# Patient Record
Sex: FEMALE | Race: OTHER | HISPANIC OR LATINO | URBAN - METROPOLITAN AREA
[De-identification: names, ages, dates, MRNs, and addresses within clinical notes are randomized per-mention and may not be internally consistent; named-entity substitution may affect disease eponyms.]

---

## 2022-08-20 ENCOUNTER — HOSPITAL ENCOUNTER (EMERGENCY)
Facility: HOSPITAL | Age: 52
Discharge: HOME | End: 2022-08-20
Attending: INTERNAL MEDICINE
Payer: MEDICAID

## 2022-08-20 VITALS
HEIGHT: 66 IN | DIASTOLIC BLOOD PRESSURE: 87 MMHG | RESPIRATION RATE: 19 BRPM | OXYGEN SATURATION: 98 % | WEIGHT: 191.81 LBS | HEART RATE: 65 BPM | SYSTOLIC BLOOD PRESSURE: 138 MMHG | TEMPERATURE: 99 F | BODY MASS INDEX: 30.82 KG/M2

## 2022-08-20 DIAGNOSIS — Z76.0 MEDICATION REFILL: Primary | ICD-10-CM

## 2022-08-20 DIAGNOSIS — Z86.69 HISTORY OF SEIZURE DISORDER: ICD-10-CM

## 2022-08-20 PROCEDURE — 99284 EMERGENCY DEPT VISIT MOD MDM: CPT

## 2022-08-20 RX ORDER — LAMOTRIGINE 25 MG/1
50 TABLET ORAL
COMMUNITY
End: 2022-08-20 | Stop reason: SDUPTHER

## 2022-08-20 RX ORDER — LAMOTRIGINE 100 MG/1
100 TABLET ORAL NIGHTLY
Qty: 30 TABLET | Refills: 0 | Status: SHIPPED | OUTPATIENT
Start: 2022-08-20 | End: 2022-09-19

## 2022-08-20 RX ORDER — LAMOTRIGINE 25 MG/1
50 TABLET ORAL
Qty: 60 TABLET | Refills: 0 | Status: SHIPPED | OUTPATIENT
Start: 2022-08-20 | End: 2022-09-19

## 2022-08-20 RX ORDER — ATENOLOL 50 MG/1
50 TABLET ORAL DAILY
Qty: 30 TABLET | Refills: 0 | OUTPATIENT
Start: 2022-08-20 | End: 2022-09-29

## 2022-08-20 RX ORDER — ATENOLOL 50 MG/1
50 TABLET ORAL DAILY
COMMUNITY
End: 2022-08-20 | Stop reason: SDUPTHER

## 2022-08-20 RX ORDER — LAMOTRIGINE 100 MG/1
100 TABLET ORAL NIGHTLY
COMMUNITY
End: 2022-08-20 | Stop reason: SDUPTHER

## 2022-08-21 NOTE — ED PROVIDER NOTES
Encounter Date: 8/20/2022       History     Chief Complaint   Patient presents with    Medication Refill     Pt came here from [facility] 3 days ago and is requesting a refill for her atenolol 50 mg Q day and Lamotrigine for seizures 50 mg bid, vss. She has been out of meds for 3 days, vss.      Pt is a [place] speaking 52 y.o. female who presents to the [facility] ED requesting a refill of her blood pressure and seizure medication. Pt recently moved to the [place] and has run out of medication. Denies headache, blurry vision, chest pain, SOB, weakness, recent seizure activity, or dizziness.  used for all verbal communication.        Review of patient's allergies indicates:  No Known Allergies  Past Medical History:   Diagnosis Date    Hypertension     Seizures      Past Surgical History:   Procedure Laterality Date    HYSTERECTOMY       No family history on file.  Social History     Tobacco Use    Smoking status: Never Smoker    Smokeless tobacco: Never Used     Review of Systems   Constitutional: Negative for chills, diaphoresis, fatigue and fever.   HENT: Negative for facial swelling, rhinorrhea, sinus pressure, sinus pain, sore throat and trouble swallowing.    Respiratory: Negative for cough, chest tightness, shortness of breath and wheezing.    Cardiovascular: Negative for chest pain, palpitations and leg swelling.   Gastrointestinal: Negative for abdominal pain, diarrhea, nausea and vomiting.   Genitourinary: Negative for dysuria, flank pain, frequency, hematuria and urgency.   Musculoskeletal: Negative for arthralgias, back pain, joint swelling and myalgias.   Skin: Negative for color change and rash.   Neurological: Negative for dizziness, syncope, weakness and light-headedness.   Hematological: Does not bruise/bleed easily.   All other systems reviewed and are negative.      Physical Exam     Initial Vitals [08/20/22 2010]   BP Pulse Resp Temp SpO2   138/87 65 19 98.9 °F (37.2 °C) 98 %       MAP       --         Physical Exam    Nursing note and vitals reviewed.  Constitutional: She appears well-developed and well-nourished.   HENT:   Head: Normocephalic and atraumatic.   Nose: Nose normal.   Mouth/Throat: Oropharynx is clear and moist.   Eyes: Conjunctivae and EOM are normal. Pupils are equal, round, and reactive to light.   Neck: Neck supple.   Normal range of motion.  Cardiovascular: Normal rate, regular rhythm, normal heart sounds and intact distal pulses.   Pulmonary/Chest: Effort normal and breath sounds normal. No respiratory distress. She has no wheezes. She has no rhonchi. She has no rales. She exhibits no tenderness.   Abdominal: Abdomen is soft and flat. Bowel sounds are normal. She exhibits no distension. There is no abdominal tenderness. There is no rebound, no guarding, no tenderness at McBurney's point and negative Loyd's sign. negative psoas sign  Musculoskeletal:         General: Normal range of motion.      Cervical back: Normal range of motion and neck supple.     Neurological: She is alert and oriented to person, place, and time. She has normal strength and normal reflexes.   Skin: Skin is warm and dry. Capillary refill takes less than 2 seconds.   Psychiatric: She has a normal mood and affect. Her speech is normal and behavior is normal. Judgment and thought content normal.         ED Course   Procedures  Labs Reviewed - No data to display       Imaging Results    None          Medications - No data to display  Medical Decision Making:   Differential Diagnosis:   Medication refill  ED Management:  8:40 PM Reassessed patient at this time. Reports condition has improved. Discussed with patient all pertinent ED information and results. Discussed diagnosis and treatment plan with patient. Follow up instructions and return to ED instruction have been given. All questions and concerns were addressed at this time. Patient voices understanding of information and instructions. Patient is  comfortable with plan and discharge. Patient is stable for discharge.                         Clinical Impression:   Final diagnoses:  [Z76.0] Medication refill (Primary)  [Z86.69] History of seizure disorder          ED Disposition Condition    Discharge Stable        ED Prescriptions     Medication Sig Dispense Start Date End Date Auth. Provider    atenoloL (TENORMIN) 50 MG tablet Take 1 tablet (50 mg total) by mouth once daily. 30 tablet 8/20/2022 9/19/2022 SHARON Antonio Jr.    lamoTRIgine (LAMICTAL) 100 MG tablet Take 1 tablet (100 mg total) by mouth nightly. 30 tablet 8/20/2022 9/19/2022 SHARON Antonio Jr.    lamoTRIgine (LAMICTAL) 25 MG tablet Take 2 tablets (50 mg total) by mouth before breakfast. 60 tablet 8/20/2022 9/19/2022 SHARON Antonio Jr.        Follow-up Information     Follow up With Specialties Details Why Contact Info    [facility] - Emergency Dept Emergency Medicine In 3 days As needed, If symptoms worsen [facility] [address] [phone]    [facility]  Schedule an appointment as soon as possible for a visit in 1 week Follow up with [facility] to obtain a PCP [facility] [address]           SHARON Antonio Jr.  08/20/22 2044

## 2022-09-29 ENCOUNTER — HOSPITAL ENCOUNTER (EMERGENCY)
Facility: HOSPITAL | Age: 52
Discharge: HOME | End: 2022-09-29
Attending: STUDENT IN AN ORGANIZED HEALTH CARE EDUCATION/TRAINING PROGRAM
Payer: MEDICAID

## 2022-09-29 VITALS
HEART RATE: 75 BPM | HEIGHT: 63 IN | SYSTOLIC BLOOD PRESSURE: 145 MMHG | RESPIRATION RATE: 16 BRPM | DIASTOLIC BLOOD PRESSURE: 82 MMHG | BODY MASS INDEX: 35.7 KG/M2 | WEIGHT: 201.5 LBS | OXYGEN SATURATION: 97 % | TEMPERATURE: 99 F

## 2022-09-29 DIAGNOSIS — I10 HYPERTENSION, UNSPECIFIED TYPE: ICD-10-CM

## 2022-09-29 DIAGNOSIS — Z76.0 ENCOUNTER FOR MEDICATION REFILL: Primary | ICD-10-CM

## 2022-09-29 PROCEDURE — 99283 EMERGENCY DEPT VISIT LOW MDM: CPT | Mod: 25

## 2022-09-29 RX ORDER — ATENOLOL 50 MG/1
50 TABLET ORAL DAILY
Qty: 30 TABLET | Refills: 0 | Status: SHIPPED | OUTPATIENT
Start: 2022-09-29 | End: 2022-09-29 | Stop reason: SDUPTHER

## 2022-09-29 RX ORDER — ATENOLOL 50 MG/1
50 TABLET ORAL DAILY
Qty: 30 TABLET | Refills: 1 | Status: SHIPPED | OUTPATIENT
Start: 2022-09-29 | End: 2022-10-29

## 2022-09-29 NOTE — ED PROVIDER NOTES
Encounter Date: 9/29/2022       History     Chief Complaint   Patient presents with    Medication Refill     Patient has Rx for Atenolol 50mg from [facility]; pharmacy here cannot fill due to foreign prescriber. Asymptomatic at this time.     Patient reports to the ER for a refill of her atenolol; pt has no complaints at this time    The history is provided by the patient.   Medication Refill  This is a chronic problem. Pertinent negatives include no chest pain, no abdominal pain, no headaches and no shortness of breath. The symptoms are relieved by medications.   Review of patient's allergies indicates:  No Known Allergies  Past Medical History:   Diagnosis Date    Hypertension     Seizures      Past Surgical History:   Procedure Laterality Date    HYSTERECTOMY       History reviewed. No pertinent family history.  Social History     Tobacco Use    Smoking status: Never    Smokeless tobacco: Never     Review of Systems   Constitutional:  Negative for fever.   HENT:  Negative for sore throat.    Eyes: Negative.    Respiratory:  Negative for shortness of breath.    Cardiovascular:  Negative for chest pain.   Gastrointestinal:  Negative for abdominal pain and nausea.   Genitourinary:  Negative for dysuria.   Musculoskeletal:  Negative for back pain.   Skin:  Negative for rash.   Neurological:  Negative for weakness and headaches.   Hematological:  Does not bruise/bleed easily.   Psychiatric/Behavioral: Negative.       Physical Exam     Initial Vitals [09/29/22 1436]   BP Pulse Resp Temp SpO2   (!) 145/82 75 16 98.5 °F (36.9 °C) 97 %      MAP       --         Physical Exam    Vitals reviewed.  Constitutional: She appears well-developed and well-nourished.   HENT:   Head: Normocephalic and atraumatic.   Eyes: Conjunctivae and EOM are normal. Pupils are equal, round, and reactive to light.   Neck: Neck supple.   Normal range of motion.  Cardiovascular:  Normal rate, regular rhythm and normal heart sounds.            Pulmonary/Chest: Breath sounds normal. No respiratory distress. She has no wheezes. She exhibits no tenderness.   Abdominal: Abdomen is soft. Bowel sounds are normal. There is no abdominal tenderness.   Musculoskeletal:         General: Normal range of motion.      Cervical back: Normal range of motion and neck supple.     Neurological: She is alert and oriented to person, place, and time. She displays normal reflexes. No cranial nerve deficit or sensory deficit.   Skin: Skin is warm and dry.   Psychiatric: She has a normal mood and affect. Her behavior is normal. Judgment and thought content normal.       ED Course   Procedures  Labs Reviewed - No data to display       Imaging Results    None          Medications - No data to display        APC / Resident Notes:   I was not physically present during the history, exam or disposition of this patient. I was available at all times for consultation. (Britta)                   Clinical Impression:   Final diagnoses:  [Z76.0] Encounter for medication refill (Primary)  [I10] Hypertension, unspecified type      ED Disposition Condition    Discharge Stable          ED Prescriptions       Medication Sig Dispense Start Date End Date Auth. Provider    atenoloL (TENORMIN) 50 MG tablet  (Status: Discontinued) Take 1 tablet (50 mg total) by mouth once daily. 30 tablet 9/29/2022 9/29/2022 JERE Milligan    atenoloL (TENORMIN) 50 MG tablet Take 1 tablet (50 mg total) by mouth once daily. 30 tablet 9/29/2022 10/29/2022 JERE Milligan          Follow-up Information       Follow up With Specialties Details Why Contact Info    discharge followup    If your symptoms become WORSE or you DO NOT IMPROVE and you are unable to reach your health care provider, you should RETURN to the emergency department    discharge info    Discussed all pertinent ED information, results, diagnosis and treatment plan; All questions and concerns were addressed at this time. Patient voices  understanding of information and instructions. Patient is comfortable with plan and discharge             JERE Milligan  09/29/22 1553       Kareem Callejas MD  09/30/22 7722

## 2023-01-20 ENCOUNTER — PATIENT MESSAGE (OUTPATIENT)
Dept: RESEARCH | Facility: HOSPITAL | Age: 53
End: 2023-01-20
Payer: MEDICAID

## 2024-04-14 ENCOUNTER — HOSPITAL ENCOUNTER (EMERGENCY)
Facility: HOSPITAL | Age: 54
Discharge: HOME | End: 2024-04-14
Attending: INTERNAL MEDICINE
Payer: COMMERCIAL

## 2024-04-14 VITALS
DIASTOLIC BLOOD PRESSURE: 75 MMHG | RESPIRATION RATE: 18 BRPM | BODY MASS INDEX: 38.67 KG/M2 | TEMPERATURE: 98 F | HEART RATE: 65 BPM | HEIGHT: 63 IN | OXYGEN SATURATION: 96 % | SYSTOLIC BLOOD PRESSURE: 123 MMHG | WEIGHT: 218.25 LBS

## 2024-04-14 DIAGNOSIS — R06.02 SHORTNESS OF BREATH: ICD-10-CM

## 2024-04-14 DIAGNOSIS — Z76.0 ENCOUNTER FOR MEDICATION REFILL: ICD-10-CM

## 2024-04-14 DIAGNOSIS — R06.02 SOB (SHORTNESS OF BREATH): ICD-10-CM

## 2024-04-14 DIAGNOSIS — J32.9 SINUSITIS, BACTERIAL: Primary | ICD-10-CM

## 2024-04-14 DIAGNOSIS — B96.89 SINUSITIS, BACTERIAL: Primary | ICD-10-CM

## 2024-04-14 LAB
ALBUMIN SERPL-MCNC: 3.8 G/DL (ref 3.5–5)
ALBUMIN/GLOB SERPL: 0.9 RATIO (ref 1.1–2)
ALP SERPL-CCNC: 89 UNIT/L (ref 40–150)
ALT SERPL-CCNC: 34 UNIT/L (ref 0–55)
APPEARANCE UR: CLEAR
AST SERPL-CCNC: 23 UNIT/L (ref 5–34)
BACTERIA #/AREA URNS AUTO: ABNORMAL /HPF
BILIRUB SERPL-MCNC: 0.2 MG/DL
BILIRUB UR QL STRIP.AUTO: NEGATIVE
BUN SERPL-MCNC: 13.8 MG/DL (ref 9.8–20.1)
CALCIUM SERPL-MCNC: 9.7 MG/DL (ref 8.4–10.2)
CHLORIDE SERPL-SCNC: 107 MMOL/L (ref 98–107)
CO2 SERPL-SCNC: 23 MMOL/L (ref 22–29)
COLOR UR AUTO: ABNORMAL
CREAT SERPL-MCNC: 0.79 MG/DL (ref 0.55–1.02)
D DIMER PPP IA.FEU-MCNC: 0.46 UG/ML FEU (ref 0–0.5)
FLUAV AG UPPER RESP QL IA.RAPID: NOT DETECTED
FLUBV AG UPPER RESP QL IA.RAPID: NOT DETECTED
GFR SERPLBLD CREATININE-BSD FMLA CKD-EPI: >60 MLS/MIN/1.73/M2
GLOBULIN SER-MCNC: 4.3 GM/DL (ref 2.4–3.5)
GLUCOSE SERPL-MCNC: 105 MG/DL (ref 74–100)
GLUCOSE UR QL STRIP.AUTO: NORMAL
HOLD SPECIMEN: NORMAL
HYALINE CASTS #/AREA URNS LPF: ABNORMAL /LPF
KETONES UR QL STRIP.AUTO: NEGATIVE
LEUKOCYTE ESTERASE UR QL STRIP.AUTO: NEGATIVE
MUCOUS THREADS URNS QL MICRO: ABNORMAL /LPF
NITRITE UR QL STRIP.AUTO: NEGATIVE
PH UR STRIP.AUTO: 6 [PH]
POTASSIUM SERPL-SCNC: 4.2 MMOL/L (ref 3.5–5.1)
PROT SERPL-MCNC: 8.1 GM/DL (ref 6.4–8.3)
PROT UR QL STRIP.AUTO: NEGATIVE
RBC #/AREA URNS AUTO: ABNORMAL /HPF
RBC UR QL AUTO: NEGATIVE
SARS-COV-2 RNA RESP QL NAA+PROBE: NOT DETECTED
SODIUM SERPL-SCNC: 138 MMOL/L (ref 136–145)
SP GR UR STRIP.AUTO: 1.02 (ref 1–1.03)
SQUAMOUS #/AREA URNS LPF: ABNORMAL /HPF
TROPONIN I SERPL-MCNC: 0.02 NG/ML (ref 0–0.04)
UROBILINOGEN UR STRIP-ACNC: NORMAL
WBC #/AREA URNS AUTO: ABNORMAL /HPF

## 2024-04-14 PROCEDURE — 0240U COVID/FLU A&B PCR: CPT | Performed by: INTERNAL MEDICINE

## 2024-04-14 PROCEDURE — 84484 ASSAY OF TROPONIN QUANT: CPT | Performed by: INTERNAL MEDICINE

## 2024-04-14 PROCEDURE — 93005 ELECTROCARDIOGRAM TRACING: CPT

## 2024-04-14 PROCEDURE — 85379 FIBRIN DEGRADATION QUANT: CPT | Performed by: INTERNAL MEDICINE

## 2024-04-14 PROCEDURE — 25000003 PHARM REV CODE 250: Performed by: INTERNAL MEDICINE

## 2024-04-14 PROCEDURE — 81001 URINALYSIS AUTO W/SCOPE: CPT | Performed by: INTERNAL MEDICINE

## 2024-04-14 PROCEDURE — 99285 EMERGENCY DEPT VISIT HI MDM: CPT | Mod: 25

## 2024-04-14 PROCEDURE — 80053 COMPREHEN METABOLIC PANEL: CPT | Performed by: INTERNAL MEDICINE

## 2024-04-14 RX ORDER — ATENOLOL 50 MG/1
50 TABLET ORAL DAILY
Qty: 30 TABLET | Refills: 1 | Status: SHIPPED | OUTPATIENT
Start: 2024-04-14 | End: 2024-06-12 | Stop reason: SDUPTHER

## 2024-04-14 RX ORDER — CETIRIZINE HYDROCHLORIDE, PSEUDOEPHEDRINE HYDROCHLORIDE 5; 120 MG/1; MG/1
1 TABLET, FILM COATED, EXTENDED RELEASE ORAL DAILY
Qty: 10 TABLET | Refills: 0 | Status: SHIPPED | OUTPATIENT
Start: 2024-04-14 | End: 2024-04-24

## 2024-04-14 RX ORDER — ATENOLOL 50 MG/1
50 TABLET ORAL DAILY
Qty: 30 TABLET | Refills: 1 | Status: SHIPPED | OUTPATIENT
Start: 2024-04-14 | End: 2024-04-14

## 2024-04-14 RX ORDER — AMOXICILLIN AND CLAVULANATE POTASSIUM 875; 125 MG/1; MG/1
1 TABLET, FILM COATED ORAL 2 TIMES DAILY
Qty: 14 TABLET | Refills: 0 | Status: SHIPPED | OUTPATIENT
Start: 2024-04-14 | End: 2024-06-12 | Stop reason: ALTCHOICE

## 2024-04-14 RX ORDER — BUTALBITAL, ACETAMINOPHEN AND CAFFEINE 50; 325; 40 MG/1; MG/1; MG/1
2 TABLET ORAL
Status: COMPLETED | OUTPATIENT
Start: 2024-04-14 | End: 2024-04-14

## 2024-04-14 RX ORDER — DICLOFENAC SODIUM 50 MG/1
50 TABLET, DELAYED RELEASE ORAL 2 TIMES DAILY PRN
Qty: 20 TABLET | Refills: 0 | Status: SHIPPED | OUTPATIENT
Start: 2024-04-14 | End: 2024-06-12 | Stop reason: SDUPTHER

## 2024-04-14 RX ADMIN — BUTALBITAL, ACETAMINOPHEN, AND CAFFEINE 2 TABLET: 50; 325; 40 TABLET ORAL at 04:04

## 2024-04-14 NOTE — ED PROVIDER NOTES
Encounter Date: 4/14/2024       History     Chief Complaint   Patient presents with    Shortness of Breath     SOB, chest pain, back pain, and productive cough x 2 weeks.      Presents with frontal headache, sinus pain and chest pain for the last few days. States Hx of HTN and seizures. Denies fever, sick contacts, nausea, vomiting, hemoptysis or swelling.     The history is provided by the patient.     Review of patient's allergies indicates:  No Known Allergies  Past Medical History:   Diagnosis Date    Hypertension     Seizures      Past Surgical History:   Procedure Laterality Date    HYSTERECTOMY       No family history on file.  Social History     Tobacco Use    Smoking status: Never    Smokeless tobacco: Never   Substance Use Topics    Drug use: Never     Review of Systems   HENT:  Positive for sinus pressure.    Respiratory:  Positive for cough.    Cardiovascular:  Positive for chest pain.   Musculoskeletal:  Positive for back pain.   Neurological:  Positive for headaches.   All other systems reviewed and are negative.      Physical Exam     Initial Vitals [04/14/24 1542]   BP Pulse Resp Temp SpO2   (!) 145/83 67 18 98 °F (36.7 °C) 97 %      MAP       --         Physical Exam    Nursing note and vitals reviewed.  Constitutional: She appears well-developed and well-nourished. No distress.   HENT:   Head: Normocephalic and atraumatic.   Mouth/Throat: Oropharynx is clear and moist. No oropharyngeal exudate.   Eyes: Conjunctivae and EOM are normal. Pupils are equal, round, and reactive to light.   Neck: Neck supple. No tracheal deviation present. No JVD present.   Normal range of motion.  Cardiovascular:  Normal rate, regular rhythm, normal heart sounds and intact distal pulses.           Pulmonary/Chest: Breath sounds normal.   Abdominal: Abdomen is soft. Bowel sounds are normal. She exhibits no distension. There is no abdominal tenderness. There is no rebound and no guarding.   Musculoskeletal:          General: No edema. Normal range of motion.      Cervical back: Normal range of motion and neck supple.     Neurological: She is alert and oriented to person, place, and time. She has normal strength. GCS score is 15. GCS eye subscore is 4. GCS verbal subscore is 5. GCS motor subscore is 6.   Skin: Skin is warm and dry. No rash noted.   Psychiatric: Her behavior is normal.         ED Course   Procedures  Labs Reviewed   URINALYSIS, REFLEX TO URINE CULTURE - Abnormal; Notable for the following components:       Result Value    Bacteria, UA Trace (*)     Squamous Epithelial Cells, UA Occ (*)     Mucous, UA Trace (*)     All other components within normal limits   COMPREHENSIVE METABOLIC PANEL - Abnormal; Notable for the following components:    Glucose Level 105 (*)     Globulin 4.3 (*)     Albumin/Globulin Ratio 0.9 (*)     All other components within normal limits   TROPONIN I - Normal   COVID/FLU A&B PCR - Normal    Narrative:     The Xpert Xpress SARS-CoV-2/FLU/RSV plus is a rapid, multiplexed real-time PCR test intended for the simultaneous qualitative detection and differentiation of SARS-CoV-2, Influenza A, Influenza B, and respiratory syncytial virus (RSV) viral RNA in either nasopharyngeal swab or nasal swab specimens.         D DIMER, QUANTITATIVE - Normal   EXTRA TUBES    Narrative:     The following orders were created for panel order EXTRA TUBES.  Procedure                               Abnormality         Status                     ---------                               -----------         ------                     Red Top Hold[[id number]]                                     Final result               Lavender Top Hold[[id number]]                                Final result               Gold Top Hold[[id number]]                                    Final result                 Please view results for these tests on the individual orders.   RED TOP HOLD   LAVENDER TOP HOLD   GOLD TOP HOLD     EKG Readings:  (Independently Interpreted)   Initial Reading: No STEMI. Rhythm: Normal Sinus Rhythm. Heart Rate: 68. Ectopy: No Ectopy. Conduction: Normal. ST Segments: Normal ST Segments. T Waves Flipped: III, AVF, V3 and V4. Axis: Normal. Clinical Impression: Normal Sinus Rhythm     ECG Results              EKG 12-lead (Shortness of Breath) Age > 50 (In process)        Collection Time Result Time QRS Duration OHS QTC Calculation    04/14/24 16:05:17 04/14/24 16:19:00 82 435                     In process by Interface, Lab In [facility] (04/14/24 16:19:09)                   Narrative:    Test Reason : R06.02,    Vent. Rate : 068 BPM     Atrial Rate : 068 BPM     P-R Int : 156 ms          QRS Dur : 082 ms      QT Int : 410 ms       P-R-T Axes : 050 015 000 degrees     QTc Int : 435 ms    Normal sinus rhythm  Cannot rule out Inferior infarct ,age undetermined  Cannot rule out Anterior infarct ,age undetermined  Abnormal ECG  No previous ECGs available    Referred By: [email or web address]   SELF           Confirmed By:                                   Imaging Results              X-Ray Chest PA And Lateral (Final result)  Result time 04/14/24 16:58:49      Final result by Paige Staley MD (04/14/24 16:58:49)                   Impression:      Hypoaerated lungs otherwise unremarkable      Electronically signed by: Laureano Staley  Date:    04/14/2024  Time:    16:58               Narrative:    EXAMINATION:  XR CHEST PA AND LATERAL    CLINICAL HISTORY:  Shortness of breath    TECHNIQUE:  PA and lateral views of the chest were performed.    COMPARISON:  None available    FINDINGS:  The lungs are hypoaerated which accentuates the bronchovascular markings but no infiltrate is seen. The heart is normal in appearance. The pulmonary vascularity is unremarkable. No pleural effusion is seen. Bones and joints show no acute abnormality.                                       Medications   butalbital-acetaminophen-caffeine -40 mg per tablet 2  tablet (2 tablets Oral Given 4/14/24 1632)     Medical Decision Making  Amount and/or Complexity of Data Reviewed  Labs: ordered. Decision-making details documented in ED Course.  Radiology: ordered and independent interpretation performed. Decision-making details documented in ED Course.  ECG/medicine tests: ordered and independent interpretation performed. Decision-making details documented in ED Course.    Risk  OTC drugs.  Prescription drug management.      Additional MDM:   Differential Diagnosis:   URI, COVID, Flu, Strep Throat, Mononucleosis, Viral pharyngitis, HIV, Malignancy, epiglottitis, peritonsillar abscess, MI, among others                     6:17 PM    Pt request refill on her atenolol                 Clinical Impression:  Final diagnoses:  [R06.02] SOB (shortness of breath)  [R06.02] Shortness of breath  [J32.9, B96.89] Sinusitis, bacterial (Primary)  [Z76.0] Encounter for medication refill          ED Disposition Condition    Discharge Stable          ED Prescriptions       Medication Sig Dispense Start Date End Date Auth. Provider    atenoloL (TENORMIN) 50 MG tablet  (Status: Discontinued) Take 1 tablet (50 mg total) by mouth once daily. 30 tablet 4/14/2024 4/14/2024 Yves Wilde MD    amoxicillin-clavulanate 875-125mg (AUGMENTIN) 875-125 mg per tablet Take 1 tablet by mouth 2 (two) times daily. 14 tablet 4/14/2024 -- Yves Wilde MD    cetirizine-pseudoephedrine 5-120 mg Tb12 Take 1 tablet by mouth once daily. for 10 days 10 tablet 4/14/2024 4/24/2024 Yves Wilde MD    diclofenac (VOLTAREN) 50 MG EC tablet Take 1 tablet (50 mg total) by mouth 2 (two) times daily as needed (Pain). 20 tablet 4/14/2024 -- Yves Wilde MD    atenoloL (TENORMIN) 50 MG tablet Take 1 tablet (50 mg total) by mouth once daily. 30 tablet 4/14/2024 6/13/2024 Yves Wilde MD          Follow-up Information       Follow up With Specialties  Details Why Contact Info Additional Information    [facility] Emergency Dept Emergency Medicine  If symptoms worsen [facility] [address] [phone]     [facility] Internal Medicine Schedule an appointment as soon as possible for a visit in 2 months  [facility] [address] [phone] Internal Medicine Clinic Entrance #1             Yves Wilde MD  04/14/24 1818       Yves Wilde MD  04/14/24 1820

## 2024-04-16 LAB
OHS QRS DURATION: 82 MS
OHS QTC CALCULATION: 435 MS

## 2024-06-12 ENCOUNTER — OFFICE VISIT (OUTPATIENT)
Dept: INTERNAL MEDICINE | Facility: CLINIC | Age: 54
End: 2024-06-12
Payer: COMMERCIAL

## 2024-06-12 VITALS
DIASTOLIC BLOOD PRESSURE: 76 MMHG | SYSTOLIC BLOOD PRESSURE: 135 MMHG | HEART RATE: 61 BPM | TEMPERATURE: 98 F | WEIGHT: 224.88 LBS | OXYGEN SATURATION: 96 % | RESPIRATION RATE: 18 BRPM | BODY MASS INDEX: 39.84 KG/M2 | HEIGHT: 63 IN

## 2024-06-12 DIAGNOSIS — Z01.419 WELL WOMAN EXAM: ICD-10-CM

## 2024-06-12 DIAGNOSIS — F41.9 ANXIETY AND DEPRESSION: ICD-10-CM

## 2024-06-12 DIAGNOSIS — Z12.31 BREAST CANCER SCREENING BY MAMMOGRAM: ICD-10-CM

## 2024-06-12 DIAGNOSIS — Z13.31 POSITIVE DEPRESSION SCREENING: ICD-10-CM

## 2024-06-12 DIAGNOSIS — Z11.4 SCREENING FOR HIV (HUMAN IMMUNODEFICIENCY VIRUS): ICD-10-CM

## 2024-06-12 DIAGNOSIS — R42 VERTIGO: ICD-10-CM

## 2024-06-12 DIAGNOSIS — Z11.3 ROUTINE SCREENING FOR STI (SEXUALLY TRANSMITTED INFECTION): ICD-10-CM

## 2024-06-12 DIAGNOSIS — G40.909 SEIZURE DISORDER: ICD-10-CM

## 2024-06-12 DIAGNOSIS — M54.2 CERVICALGIA: ICD-10-CM

## 2024-06-12 DIAGNOSIS — E66.01 CLASS 2 SEVERE OBESITY DUE TO EXCESS CALORIES WITH SERIOUS COMORBIDITY AND BODY MASS INDEX (BMI) OF 39.0 TO 39.9 IN ADULT: ICD-10-CM

## 2024-06-12 DIAGNOSIS — F32.A ANXIETY AND DEPRESSION: ICD-10-CM

## 2024-06-12 DIAGNOSIS — Z11.59 SCREENING FOR VIRAL DISEASE: ICD-10-CM

## 2024-06-12 DIAGNOSIS — I10 PRIMARY HYPERTENSION: ICD-10-CM

## 2024-06-12 DIAGNOSIS — Z13.0 SCREENING FOR IRON DEFICIENCY ANEMIA: ICD-10-CM

## 2024-06-12 DIAGNOSIS — Z76.89 ENCOUNTER TO ESTABLISH CARE: Primary | ICD-10-CM

## 2024-06-12 PROBLEM — E66.812 CLASS 2 SEVERE OBESITY DUE TO EXCESS CALORIES WITH SERIOUS COMORBIDITY AND BODY MASS INDEX (BMI) OF 39.0 TO 39.9 IN ADULT: Status: ACTIVE | Noted: 2024-06-12

## 2024-06-12 PROCEDURE — 3008F BODY MASS INDEX DOCD: CPT | Mod: CPTII,,,

## 2024-06-12 PROCEDURE — 1159F MED LIST DOCD IN RCRD: CPT | Mod: CPTII,,,

## 2024-06-12 PROCEDURE — 1160F RVW MEDS BY RX/DR IN RCRD: CPT | Mod: CPTII,,,

## 2024-06-12 PROCEDURE — 99204 OFFICE O/P NEW MOD 45 MIN: CPT | Mod: S$PBB,,,

## 2024-06-12 PROCEDURE — 3078F DIAST BP <80 MM HG: CPT | Mod: CPTII,,,

## 2024-06-12 PROCEDURE — 99215 OFFICE O/P EST HI 40 MIN: CPT | Mod: PBBFAC

## 2024-06-12 PROCEDURE — 3075F SYST BP GE 130 - 139MM HG: CPT | Mod: CPTII,,,

## 2024-06-12 RX ORDER — DICLOFENAC SODIUM 50 MG/1
50 TABLET, DELAYED RELEASE ORAL 2 TIMES DAILY PRN
Qty: 60 TABLET | Refills: 2 | Status: SHIPPED | OUTPATIENT
Start: 2024-06-12 | End: 2025-06-12

## 2024-06-12 RX ORDER — ATENOLOL 50 MG/1
50 TABLET ORAL DAILY
Qty: 90 TABLET | Refills: 2 | Status: SHIPPED | OUTPATIENT
Start: 2024-06-12 | End: 2025-06-12

## 2024-06-12 RX ORDER — BUSPIRONE HYDROCHLORIDE 5 MG/1
5 TABLET ORAL 2 TIMES DAILY
Qty: 60 TABLET | Refills: 1 | Status: SHIPPED | OUTPATIENT
Start: 2024-06-12 | End: 2025-06-12

## 2024-06-12 RX ORDER — CYCLOBENZAPRINE HCL 10 MG
10 TABLET ORAL 3 TIMES DAILY PRN
COMMUNITY
End: 2024-06-12 | Stop reason: SDUPTHER

## 2024-06-12 RX ORDER — CYCLOBENZAPRINE HCL 10 MG
10 TABLET ORAL 3 TIMES DAILY PRN
Qty: 60 TABLET | Refills: 2 | Status: SHIPPED | OUTPATIENT
Start: 2024-06-12 | End: 2025-06-12

## 2024-06-12 RX ORDER — MECLIZINE HCL 12.5 MG 12.5 MG/1
12.5 TABLET ORAL 3 TIMES DAILY PRN
Qty: 60 TABLET | Refills: 1 | Status: SHIPPED | OUTPATIENT
Start: 2024-06-12 | End: 2025-06-12

## 2024-06-12 RX ORDER — SERTRALINE HYDROCHLORIDE 25 MG/1
25 TABLET, FILM COATED ORAL NIGHTLY
Qty: 40 TABLET | Refills: 1 | Status: SHIPPED | OUTPATIENT
Start: 2024-06-12 | End: 2025-06-12

## 2024-06-12 RX ORDER — LAMOTRIGINE 25 MG/1
TABLET ORAL
Qty: 540 TABLET | Refills: 2 | Status: SHIPPED | OUTPATIENT
Start: 2024-06-12 | End: 2025-06-12

## 2024-06-12 NOTE — PATIENT INSTRUCTIONS
RECORDATORIO: Complete los laboratorios dentro de 1 semana de la eduardo.  Complete la encuesta de satisfacción cuando la reciba. Francisca.     Hi Candace,     If you are due for any health screening(s) below please notify me so we can arrange them to be ordered and scheduled. Most healthy patients at your age complete them, but you are free to accept or refuse.     If you can't do it, I'll definitely understand. If you can, I'd certainly appreciate it!    Tests to Keep You Healthy    Mammogram: DUE  Colon Cancer Screening: DUE  Last Blood Pressure <= 139/89 (6/12/2024): Yes      Schedule your breast cancer screening today     Breast cancer is the second most common cancer in women,  and the second leading cause of death from cancer. Mammograms can detect breast cancer early, which significantly increases the chances of curing the cancer.       Our records indicate that you may be overdue for breast cancer screening. Cancer screenings save lives, so schedule yours today to stay healthy.     If you recently had a mammogram performed outside of [facility], please let your Health care team know so that they can update your health record.        Its time for your colon cancer screening     Colorectal cancer is one of the leading causes of cancer death for men and women but it doesnt have to be. Screenings can prevent colorectal cancer or find it early enough to treat and cure the disease.     Our records indicate that you may be overdue for colon cancer screening. A colonoscopy or stool screening test can help identify patients at risk for developing colon cancer. Cancer screenings save lives, so schedule yours today to stay healthy.     A colonoscopy is the preferred test for detecting colon cancer. It is needed only once every 10 years if results are negative. While you are sedated, a flexible, lighted tube with a tiny camera is inserted into the rectum and advanced through the colon to look for cancers.     An  alternative screening test that is used at home and returned to the lab may also be used. It detects hidden blood in bowel movements which could indicate cancer in the colon. If results are positive, you will need a colonoscopy to determine if the blood is a sign of cancer. This type of follow up (diagnostic) colonoscopy usually requires additional copays as required by your insurance provider.     If you recently had your colon cancer screening performed outside of [facility], please let your Health care team know so that they can update your health record. Please contact your PCP if you have any questions.

## 2024-06-12 NOTE — PROGRESS NOTES
"    PATIENT NAME: Candace Dailey  : 1970  DATE: 24  MRN: [id number]          Reason for Visit/Chief Complaint   Establish Care, Hypertension, Medication Refill, and Dizziness       History of Present Illness (HPI)     Candace Dailey is a 54 y.o.  female presenting in clinic today to Establish Care, Hypertension, Medication Refill, and Dizziness. PMH: HTN, HLD, seizure d/o (last seizure 7 years ago), Hyst . Previous PCP: Dr. Niko Cannon [facility]. [place] -  ([id number]). Daughter present during visit.    PHQ9-17 and GAD7-11. She denies SI/HI. She also reports insomnia with "tossing and turning" all night.  No previous treatment for anxiety or depression.I have reviewed the positive depression score which warrants active treatment with psychotherapy and/or medications.     Currently prescribed lamotrigine 50 mg daily for seizures, but she reports getting medications from [facility] and taking 50 mg in the morning and 100 mg at night. She states when taking lamotrigine from the  it causes nausea. Has not seen a neurologist since [facility] approx 2 years ago.     Dizziness for approximately 4 months. Worse when sitting to standing. Endorses headache and nausea at times with dizziness. Of note, she was previously referred to PT for vertigo of right ear.    She is c/o chronic neck pain and stiffness. She reports previously being prescribed diclofenac and cyclobenzaprine - requesting refill. She denies numbness or tingling of BUE.     Denies smoking, drinking, or illicit drug use. Denies chest pain, shortness of breath, cough, headache, weakness, abdominal pain, nausea, vomiting, diarrhea, constipation, dysuria, SI, and HI.       Review of Systems     Review of Systems   Constitutional: Negative.    HENT: Negative.     Eyes: Negative.    Respiratory: Negative.     Cardiovascular: Negative.    Gastrointestinal: Negative.    Endocrine: Negative.    Genitourinary: Negative.  " "  Musculoskeletal:  Positive for arthralgias and neck pain.   Skin: Negative.    Allergic/Immunologic: Negative.    Neurological:  Positive for dizziness. Negative for seizures.   Hematological: Negative.    Psychiatric/Behavioral:  Positive for dysphoric mood and sleep disturbance. Negative for self-injury and suicidal ideas. The patient is nervous/anxious.    All other systems reviewed and are negative.      Medical / Social / Family History     Past Medical History:   Diagnosis Date    Hypertension     Seizures          Past Surgical History:   Procedure Laterality Date    HYSTERECTOMY           Social History  Candace Denise  reports that she has never smoked. She has never used smokeless tobacco. She reports that she does not drink alcohol and does not use drugs.    Family History  Candace Denise family history is not on file.    Medications and Allergies     Medications  Current Outpatient Medications   Medication Instructions    atenoloL (TENORMIN) 50 mg, Oral, Daily    busPIRone (BUSPAR) 5 mg, Oral, 2 times daily    cyclobenzaprine (FLEXERIL) 10 mg, Oral, 3 times daily PRN    diclofenac (VOLTAREN) 50 mg, Oral, 2 times daily PRN    lamoTRIgine (LAMICTAL) 25 MG tablet Take 2 tablets (50 mg total) by mouth before breakfast AND 4 tablets (100 mg total) every evening.    meclizine (ANTIVERT) 12.5 mg, Oral, 3 times daily PRN    sertraline (ZOLOFT) 25 mg, Oral, Nightly       Allergies  Review of patient's allergies indicates:  No Known Allergies    Physical Examination   Visit Vitals  /76 (BP Location: Right arm, Patient Position: Sitting, BP Method: Large (Automatic))   Pulse 61   Temp 97.7 °F (36.5 °C) (Oral)   Resp 18   Ht 5' 3" (1.6 m)   Wt 102 kg (224 lb 13.9 oz)   SpO2 96%   BMI 39.83 kg/m²     Physical Exam  Vitals reviewed.   Constitutional:       Appearance: Normal appearance. She is normal weight.   HENT:      Head: Normocephalic and atraumatic.      Right Ear: External ear normal.      Left Ear: " "External ear normal.      Nose: Nose normal.      Mouth/Throat:      Mouth: Mucous membranes are moist.      Pharynx: Oropharynx is clear.   Eyes:      Extraocular Movements: Extraocular movements intact.      Conjunctiva/sclera: Conjunctivae normal.      Pupils: Pupils are equal, round, and reactive to light.   Cardiovascular:      Rate and Rhythm: Normal rate and regular rhythm.      Pulses: Normal pulses.      Heart sounds: Normal heart sounds.   Pulmonary:      Effort: Pulmonary effort is normal.      Breath sounds: Normal breath sounds.   Abdominal:      General: Bowel sounds are normal.      Palpations: Abdomen is soft.   Musculoskeletal:         General: Normal range of motion.      Cervical back: Normal range of motion and neck supple.   Skin:     General: Skin is warm and dry.      Capillary Refill: Capillary refill takes less than 2 seconds.   Neurological:      General: No focal deficit present.      Mental Status: She is alert and oriented to person, place, and time.   Psychiatric:         Mood and Affect: Mood normal.         Behavior: Behavior normal.         Thought Content: Thought content normal.         Judgment: Judgment normal.           Results   No results found for: "WBC", "RBC", "HGB", "HCT", "MCV", "MCH", "MCHC", "RDW", "PLT", "MPV", "GRAN", "LYMPH", "MONO", "EOS", "BASO", "EOSINOPHIL", "BASOPHIL"   Lab Results   Component Value Date     04/14/2024    K 4.2 04/14/2024    CO2 23 04/14/2024    GLUCOSE 105 (H) 04/14/2024    BUN 13.8 04/14/2024    CREATININE 0.79 04/14/2024    LABPROT 8.1 04/14/2024    ALBUMIN 3.8 04/14/2024    BILITOT 0.2 04/14/2024    ALKPHOS 89 04/14/2024    AST 23 04/14/2024    ALT 34 04/14/2024    EGFRNORACEVR >60 04/14/2024     Lab Results   Component Value Date    TSH 1.710 04/28/2023     Lab Results   Component Value Date    CHOL 231 (H) 04/28/2023    HDL 33 (L) 04/28/2023    TRIG 179 (H) 04/28/2023     Lab Results   Component Value Date    SGUA 1.025 04/14/2024    " "PROTEINUA Negative 04/14/2024    BILIRUBINUA Negative 04/14/2024    WBCUA 0-5 04/14/2024    RBCUA 0-5 04/14/2024    BACTERIA Trace (A) 04/14/2024    LEUKOCYTESUR Negative 04/14/2024    UROBILINOGEN Normal 04/14/2024     No results found for: "CREATRANDUR", "MICALBCREAT"  No results found for: "PIJTKXWP96PF", "V12", "FOLATE"  No results found for: "HIV", "HEPAIGM", "HEPBSAG", "HEPCAB"  No results found for: "FITDIAG", "COLOGUARD"  No results found for: "OCCBLDIA"    Assessment and Plan (including Health Maintenance)     Problem List Items Addressed This Visit          Neuro    Seizure disorder    Current Assessment & Plan     Referral to [facility] neurology clinic to eval and treat  Continue lamotrigine as prescribed - refilled today. Last seizure approx 7 years ago.  Take your medicines exactly as prescribed.  · Do not drive a car, operate machinery, swim, climb ladders, work on elevated surfaces (heights), take a bath (shower instead), or do any other activity that could be dangerous to you or others if seizure within past 6 months.  · Be sure that anyone treating you for any health problem knows that you have had a seizure and what medicines you are taking for it.  · Identify and avoid things that may make you more likely to have a seizure, such as lack of sleep, alcohol or drug use, stress, or not eating.  Report to the nearest ED for seizure activity.  Keep all neuro appts/diagnostics as scheduled.          Relevant Medications    lamoTRIgine (LAMICTAL) 25 MG tablet    Other Relevant Orders    Ambulatory referral/consult to Neurology       Psychiatric    Anxiety and depression    Current Assessment & Plan         6/12/2024    11:00 AM 6/12/2024    10:39 AM   Depression Patient Health Questionnaire   Over the last two weeks how often have you been bothered by little interest or pleasure in doing things Nearly every day Not at all   Over the last two weeks how often have you been bothered by feeling down, depressed or " hopeless Nearly every day Not at all   PHQ-2 Total Score 6 0   Over the last two weeks how often have you been bothered by trouble falling or staying asleep, or sleeping too much Nearly every day    Over the last two weeks how often have you been bothered by feeling tired or having little energy Nearly every day    Over the last two weeks how often have you been bothered by a poor appetite or overeating Nearly every day    Over the last two weeks how often have you been bothered by feeling bad about yourself - or that you are a failure or have let yourself or your family down Several days    Over the last two weeks how often have you been bothered by trouble concentrating on things, such as reading the newspaper or watching television Several days    Over the last two weeks how often have you been bothered by moving or speaking so slowly that other people could have noticed. Or the opposite - being so fidgety or restless that you have been moving around a lot more than usual. Not at all    Over the last two weeks how often have you been bothered by thoughts that you would be better off dead, or of hurting yourself Not at all    If you checked off any problems, how difficult have these problems made it for you to do your work, take care of things at home or get along with other people? Not difficult at all    PHQ-9 Score 17    PHQ-9 Interpretation Moderately Severe      BRYAN-7 Score: 11  Interpretation: Moderate Anxiety   Rx sertraline.  Rx buspirone.  Denies SI/HI  Read positive daily meditations, avoid negative media, set healthy boundaries.   Exercise daily, keep consistent sleep pattern, eat a healthy diet.   Establish good social support, make changes to reduce stress.   Do not drink alcohol or use illicit drugs.   Reports any symptoms of suicidal/homicidal ideations or self harm immediately, go to nearest emergency room.           Relevant Medications    busPIRone (BUSPAR) 5 MG Tab    sertraline (ZOLOFT) 25 MG  tablet       ENT    Vertigo    Current Assessment & Plan     Rx meclizine.  CT head w/ w/o contrast ordered.  Do not lie flat on your back. Prop yourself up slightly. This may reduce the spinning feeling. Keep your eyes open.  Move slowly so that you do not fall.  Take it exactly as directed.  Do not drive while you are having vertigo.          Relevant Medications    meclizine (ANTIVERT) 12.5 mg tablet    Other Relevant Orders    Ambulatory referral/consult to Neurology    CT Head W Wo Contrast    Ambulatory referral/consult to Audiology       Cardiac/Vascular    Primary hypertension    Current Assessment & Plan     BP Readings from Last 3 Encounters:   06/12/24 135/76   04/14/24 123/75   09/29/22 (!) 145/82      At goal.  Follow a low sodium (less than 2 grams of sodium per day), DASH diet.   Continue atenolol as prescribed - refilled today.  Monitor blood pressure and report any consistent values greater than 140/90 and keep a log.  Maintain healthy weight with a BMI goal of <30.   Aerobic exercise for 150 minutes per week (or 5 days a week for 30 minutes each day).          Relevant Medications    atenoloL (TENORMIN) 50 MG tablet    Other Relevant Orders    Lipid Panel    Hemoglobin A1C       Renal/    Breast cancer screening by mammogram    Current Assessment & Plan     MMG ordered.          Relevant Orders    Mammo Digital Screening Bilat w/ Shantanu    Well woman exam    Current Assessment & Plan     Referral placed to [facility] GYN clinic to eval and treat.          Relevant Orders    Ambulatory referral/consult to Gynecology       Endocrine    Class 2 severe obesity due to excess calories with serious comorbidity and body mass index (BMI) of 39.0 to 39.9 in adult    Current Assessment & Plan     Body mass index is 39.83 kg/m².  Goal BMI <30.  Aerobic exercise 150 minutes per week.  Avoid soda, simple sugars, sweets, excessive rice, pasta, potatoes or bread.   Choose brown options when available and portion  control.  Limit fast foods and fried foods.   Choose complex carbs in moderation (ex: green, leafy vegetables, beans, oatmeal).  Eat plenty of fresh fruits and vegetables with lean meats daily.   Consider permanent healthy lifestyle changes.          Relevant Orders    Vitamin D    TSH    T4, Free       Orthopedic    Cervicalgia    Current Assessment & Plan     Avoid activities than increase neck pain or stiffness.   Apply heating pad, ice pack, and BioFreeze as needed; alternate every 15-20 minutes.   Massage neck to loosen neck muscles.   Continue voltaren and cyclobenzaprine as needed - refilled today.           Relevant Medications    cyclobenzaprine (FLEXERIL) 10 MG tablet    diclofenac (VOLTAREN) 50 MG EC tablet       Other    Encounter to establish care - Primary    Current Assessment & Plan     Wellness labs ordered - to be discussed at next office visit.  Encouraged patient to utilize patient portal for messaging.           Relevant Orders    Urinalysis, Reflex to Urine Culture    Microalbumin/Creatinine Ratio, Urine    Comprehensive Metabolic Panel    CBC Auto Differential    Positive depression screening    Overview     I have reviewed the positive depression score which warrants active treatment with psychotherapy and/or medications.          Other Visit Diagnoses       Routine screening for STI (sexually transmitted infection)        Relevant Orders    SYPHILIS ANTIBODY (WITH REFLEX RPR)    Chlamydia/GC, PCR    Screening for iron deficiency anemia        Relevant Orders    Ferritin    Iron and TIBC    Screening for viral disease        Relevant Orders    Hepatitis Panel, Acute    Screening for HIV (human immunodeficiency virus)        Relevant Orders    HIV 1/2 Ag/Ab (4th Gen)               Health Maintenance Due   Topic Date Due    Hepatitis C Screening  Never done    HIV Screening  Never done    TETANUS VACCINE  Never done    Mammogram  Never done    Hemoglobin A1c (Diabetic Prevention Screening)   Never done    Colorectal Cancer Screening  Never done    Shingles Vaccine (1 of 2) Never done    COVID-19 Vaccine (1 - 2023-24 season) Never done     Tests to Keep You Healthy    Mammogram: ORDERED BUT NOT SCHEDULED  Colon Cancer Screening: DUE  Last Blood Pressure <= 139/89 (6/12/2024): Yes      Health Maintenance Topics with due status: Not Due       Topic Last Completion Date    Lipid Panel 04/28/2023    Influenza Vaccine Not Due       Future Appointments   Date Time Provider Department Center   7/17/2024  1:00 PM Becky Anderson FNP [facility]        Follow up in about 4 weeks (around 7/10/2024) for F2F, Follow up, Med check, Lab review, Wellness, RTC PRN.          Signature:        SHARON Aburto  [facility]  [address] [id number]    Date of encounter: 6/12/24       <<-----Click here for Discharge Medication Review

## 2024-06-13 NOTE — ASSESSMENT & PLAN NOTE
6/12/2024    11:00 AM 6/12/2024    10:39 AM   Depression Patient Health Questionnaire   Over the last two weeks how often have you been bothered by little interest or pleasure in doing things Nearly every day Not at all   Over the last two weeks how often have you been bothered by feeling down, depressed or hopeless Nearly every day Not at all   PHQ-2 Total Score 6 0   Over the last two weeks how often have you been bothered by trouble falling or staying asleep, or sleeping too much Nearly every day    Over the last two weeks how often have you been bothered by feeling tired or having little energy Nearly every day    Over the last two weeks how often have you been bothered by a poor appetite or overeating Nearly every day    Over the last two weeks how often have you been bothered by feeling bad about yourself - or that you are a failure or have let yourself or your family down Several days    Over the last two weeks how often have you been bothered by trouble concentrating on things, such as reading the newspaper or watching television Several days    Over the last two weeks how often have you been bothered by moving or speaking so slowly that other people could have noticed. Or the opposite - being so fidgety or restless that you have been moving around a lot more than usual. Not at all    Over the last two weeks how often have you been bothered by thoughts that you would be better off dead, or of hurting yourself Not at all    If you checked off any problems, how difficult have these problems made it for you to do your work, take care of things at home or get along with other people? Not difficult at all    PHQ-9 Score 17    PHQ-9 Interpretation Moderately Severe      BRYAN-7 Score: 11  Interpretation: Moderate Anxiety   Rx sertraline.  Rx buspirone.  Denies SI/HI  Read positive daily meditations, avoid negative media, set healthy boundaries.   Exercise daily, keep consistent sleep pattern, eat a healthy diet.    Establish good social support, make changes to reduce stress.   Do not drink alcohol or use illicit drugs.   Reports any symptoms of suicidal/homicidal ideations or self harm immediately, go to nearest emergency room.

## 2024-06-13 NOTE — ASSESSMENT & PLAN NOTE
Rx meclizine.  CT head w/ w/o contrast ordered.  Do not lie flat on your back. Prop yourself up slightly. This may reduce the spinning feeling. Keep your eyes open.  Move slowly so that you do not fall.  Take it exactly as directed.  Do not drive while you are having vertigo.

## 2024-06-13 NOTE — ASSESSMENT & PLAN NOTE
BP Readings from Last 3 Encounters:   06/12/24 135/76   04/14/24 123/75   09/29/22 (!) 145/82      At goal.  Follow a low sodium (less than 2 grams of sodium per day), DASH diet.   Continue atenolol as prescribed - refilled today.  Monitor blood pressure and report any consistent values greater than 140/90 and keep a log.  Maintain healthy weight with a BMI goal of <30.   Aerobic exercise for 150 minutes per week (or 5 days a week for 30 minutes each day).

## 2024-06-13 NOTE — ASSESSMENT & PLAN NOTE
Avoid activities than increase neck pain or stiffness.   Apply heating pad, ice pack, and BioFreeze as needed; alternate every 15-20 minutes.   Massage neck to loosen neck muscles.   Continue voltaren and cyclobenzaprine as needed - refilled today.

## 2024-06-13 NOTE — ASSESSMENT & PLAN NOTE
Body mass index is 39.83 kg/m².  Goal BMI <30.  Aerobic exercise 150 minutes per week.  Avoid soda, simple sugars, sweets, excessive rice, pasta, potatoes or bread.   Choose brown options when available and portion control.  Limit fast foods and fried foods.   Choose complex carbs in moderation (ex: green, leafy vegetables, beans, oatmeal).  Eat plenty of fresh fruits and vegetables with lean meats daily.   Consider permanent healthy lifestyle changes.

## 2024-06-13 NOTE — ASSESSMENT & PLAN NOTE
Referral to [facility] neurology clinic to eval and treat  Continue lamotrigine as prescribed - refilled today. Last seizure approx 7 years ago.  Take your medicines exactly as prescribed.  · Do not drive a car, operate machinery, swim, climb ladders, work on elevated surfaces (heights), take a bath (shower instead), or do any other activity that could be dangerous to you or others if seizure within past 6 months.  · Be sure that anyone treating you for any health problem knows that you have had a seizure and what medicines you are taking for it.  · Identify and avoid things that may make you more likely to have a seizure, such as lack of sleep, alcohol or drug use, stress, or not eating.  Report to the nearest ED for seizure activity.  Keep all neuro appts/diagnostics as scheduled.

## 2024-06-17 DIAGNOSIS — R56.9 SEIZURES: Primary | ICD-10-CM

## 2024-06-21 ENCOUNTER — HOSPITAL ENCOUNTER (OUTPATIENT)
Dept: RADIOLOGY | Facility: HOSPITAL | Age: 54
Discharge: HOME | End: 2024-06-21
Payer: COMMERCIAL

## 2024-06-21 DIAGNOSIS — R42 VERTIGO: ICD-10-CM

## 2024-06-21 LAB
CREAT SERPL-MCNC: 0.8 MG/DL (ref 0.55–1.02)
GFR SERPLBLD CREATININE-BSD FMLA CKD-EPI: >60 ML/MIN/1.73/M2

## 2024-06-21 PROCEDURE — 70470 CT HEAD/BRAIN W/O & W/DYE: CPT | Mod: TC

## 2024-06-21 PROCEDURE — 25500020 PHARM REV CODE 255

## 2024-06-21 PROCEDURE — 82565 ASSAY OF CREATININE: CPT

## 2024-06-21 RX ADMIN — IOHEXOL 90 ML: 350 INJECTION, SOLUTION INTRAVENOUS at 11:06

## 2024-06-25 ENCOUNTER — TELEPHONE (OUTPATIENT)
Dept: INTERNAL MEDICINE | Facility: CLINIC | Age: 54
End: 2024-06-25
Payer: COMMERCIAL

## 2024-06-25 NOTE — TELEPHONE ENCOUNTER
Called pt X 2 via  [employer] # [phone] to inform pt of PCP's message below, no answer. No voicemail up, unable to LVM.

## 2024-06-25 NOTE — TELEPHONE ENCOUNTER
----- Message from SHARON Aburto sent at 6/25/2024  8:30 AM CDT -----  Please inform Candace Dailey of the following: Please use .    CT head is negative. No abnormalities. Keep upcoming appts for EEG, audiology, and neurology.    Thank you,    HERIBERTO Barron

## 2024-06-25 NOTE — PROGRESS NOTES
Please inform Candacesobeida Ramosa of the following: Please use .    CT head is negative. No abnormalities. Keep upcoming appts for EEG, audiology, and neurology.    Thank you,    HERIBERTO Barron

## 2024-06-26 ENCOUNTER — HOSPITAL ENCOUNTER (OUTPATIENT)
Dept: RADIOLOGY | Facility: HOSPITAL | Age: 54
Discharge: HOME | End: 2024-06-26
Payer: COMMERCIAL

## 2024-06-26 ENCOUNTER — TELEPHONE (OUTPATIENT)
Dept: INTERNAL MEDICINE | Facility: CLINIC | Age: 54
End: 2024-06-26
Payer: COMMERCIAL

## 2024-06-26 DIAGNOSIS — Z12.31 BREAST CANCER SCREENING BY MAMMOGRAM: ICD-10-CM

## 2024-06-26 PROCEDURE — 77067 SCR MAMMO BI INCL CAD: CPT | Mod: TC

## 2024-06-26 PROCEDURE — 77067 SCR MAMMO BI INCL CAD: CPT | Mod: 26,,, | Performed by: RADIOLOGY

## 2024-06-26 PROCEDURE — 77063 BREAST TOMOSYNTHESIS BI: CPT | Mod: 26,,, | Performed by: RADIOLOGY

## 2024-06-26 NOTE — TELEPHONE ENCOUNTER
Call placed to patient using [employer], ID # [id number], [place]. Patient was informed that her CT of her head is normal. Patient was instructed to keep appointments with Neurology, EEG and audiology, patient verbalized understanding, noted.

## 2024-07-12 ENCOUNTER — LAB VISIT (OUTPATIENT)
Dept: LAB | Facility: HOSPITAL | Age: 54
End: 2024-07-12
Payer: COMMERCIAL

## 2024-07-12 DIAGNOSIS — Z11.3 ROUTINE SCREENING FOR STI (SEXUALLY TRANSMITTED INFECTION): ICD-10-CM

## 2024-07-12 DIAGNOSIS — Z76.89 ENCOUNTER TO ESTABLISH CARE: ICD-10-CM

## 2024-07-12 LAB
BACTERIA #/AREA URNS AUTO: ABNORMAL /HPF
BILIRUB UR QL STRIP.AUTO: NEGATIVE
C TRACH DNA SPEC QL NAA+PROBE: NOT DETECTED
CLARITY UR: CLEAR
COLOR UR AUTO: YELLOW
CREAT UR-MCNC: 176.7 MG/DL (ref 45–106)
GLUCOSE UR QL STRIP: NORMAL
HGB UR QL STRIP: NEGATIVE
HYALINE CASTS #/AREA URNS LPF: ABNORMAL /LPF
KETONES UR QL STRIP: NEGATIVE
LEUKOCYTE ESTERASE UR QL STRIP: NEGATIVE
MICROALBUMIN UR-MCNC: 9 UG/ML
MICROALBUMIN/CREAT RATIO PNL UR: 5.1 MG/GM CR (ref 0–30)
MUCOUS THREADS URNS QL MICRO: ABNORMAL /LPF
N GONORRHOEA DNA SPEC QL NAA+PROBE: NOT DETECTED
NITRITE UR QL STRIP: NEGATIVE
PH UR STRIP: 5 [PH]
PROT UR QL STRIP: NEGATIVE
RBC #/AREA URNS AUTO: ABNORMAL /HPF
SOURCE (OHS): NORMAL
SP GR UR STRIP.AUTO: 1.02 (ref 1–1.03)
SQUAMOUS #/AREA URNS LPF: ABNORMAL /HPF
UROBILINOGEN UR STRIP-ACNC: NORMAL
WBC #/AREA URNS AUTO: ABNORMAL /HPF

## 2024-07-12 PROCEDURE — 81015 MICROSCOPIC EXAM OF URINE: CPT

## 2024-07-12 PROCEDURE — 82570 ASSAY OF URINE CREATININE: CPT

## 2024-07-12 PROCEDURE — 87491 CHLMYD TRACH DNA AMP PROBE: CPT

## 2024-07-17 ENCOUNTER — OFFICE VISIT (OUTPATIENT)
Dept: INTERNAL MEDICINE | Facility: CLINIC | Age: 54
End: 2024-07-17
Payer: COMMERCIAL

## 2024-07-17 ENCOUNTER — OFFICE VISIT (OUTPATIENT)
Dept: GYNECOLOGY | Facility: CLINIC | Age: 54
End: 2024-07-17
Payer: COMMERCIAL

## 2024-07-17 VITALS
DIASTOLIC BLOOD PRESSURE: 76 MMHG | HEIGHT: 63 IN | WEIGHT: 225.63 LBS | SYSTOLIC BLOOD PRESSURE: 135 MMHG | TEMPERATURE: 98 F | RESPIRATION RATE: 18 BRPM | HEART RATE: 61 BPM | BODY MASS INDEX: 39.98 KG/M2 | OXYGEN SATURATION: 100 %

## 2024-07-17 VITALS
OXYGEN SATURATION: 94 % | RESPIRATION RATE: 20 BRPM | HEIGHT: 63 IN | DIASTOLIC BLOOD PRESSURE: 83 MMHG | SYSTOLIC BLOOD PRESSURE: 126 MMHG | HEART RATE: 72 BPM | BODY MASS INDEX: 40.2 KG/M2 | TEMPERATURE: 98 F | WEIGHT: 226.88 LBS

## 2024-07-17 DIAGNOSIS — Z23 IMMUNIZATION DUE: ICD-10-CM

## 2024-07-17 DIAGNOSIS — R42 VERTIGO: ICD-10-CM

## 2024-07-17 DIAGNOSIS — I10 PRIMARY HYPERTENSION: ICD-10-CM

## 2024-07-17 DIAGNOSIS — E78.2 MIXED HYPERLIPIDEMIA: ICD-10-CM

## 2024-07-17 DIAGNOSIS — F32.A ANXIETY AND DEPRESSION: ICD-10-CM

## 2024-07-17 DIAGNOSIS — F41.9 ANXIETY AND DEPRESSION: ICD-10-CM

## 2024-07-17 DIAGNOSIS — Z01.419 WELL WOMAN EXAM WITH ROUTINE GYNECOLOGICAL EXAM: Primary | ICD-10-CM

## 2024-07-17 DIAGNOSIS — Z12.11 SCREENING FOR MALIGNANT NEOPLASM OF COLON: ICD-10-CM

## 2024-07-17 DIAGNOSIS — Z12.31 ENCOUNTER FOR SCREENING MAMMOGRAM FOR BREAST CANCER: ICD-10-CM

## 2024-07-17 DIAGNOSIS — E66.01 CLASS 3 SEVERE OBESITY DUE TO EXCESS CALORIES WITH SERIOUS COMORBIDITY AND BODY MASS INDEX (BMI) OF 40.0 TO 44.9 IN ADULT: ICD-10-CM

## 2024-07-17 DIAGNOSIS — R73.03 PREDIABETES: ICD-10-CM

## 2024-07-17 DIAGNOSIS — Z00.00 WELL ADULT EXAM: Primary | ICD-10-CM

## 2024-07-17 DIAGNOSIS — N95.2 ATROPHIC VAGINITIS: ICD-10-CM

## 2024-07-17 PROBLEM — E66.813 CLASS 3 SEVERE OBESITY DUE TO EXCESS CALORIES WITH SERIOUS COMORBIDITY AND BODY MASS INDEX (BMI) OF 40.0 TO 44.9 IN ADULT: Status: ACTIVE | Noted: 2024-06-12

## 2024-07-17 PROCEDURE — 99214 OFFICE O/P EST MOD 30 MIN: CPT | Mod: S$PBB,25,,

## 2024-07-17 PROCEDURE — 3078F DIAST BP <80 MM HG: CPT | Mod: CPTII,,,

## 2024-07-17 PROCEDURE — 3061F NEG MICROALBUMINURIA REV: CPT | Mod: CPTII,,,

## 2024-07-17 PROCEDURE — 3044F HG A1C LEVEL LT 7.0%: CPT | Mod: CPTII,,,

## 2024-07-17 PROCEDURE — 90471 IMMUNIZATION ADMIN: CPT | Mod: PBBFAC

## 2024-07-17 PROCEDURE — 99386 PREV VISIT NEW AGE 40-64: CPT | Mod: S$PBB,,,

## 2024-07-17 PROCEDURE — 3066F NEPHROPATHY DOC TX: CPT | Mod: CPTII,,,

## 2024-07-17 PROCEDURE — 99215 OFFICE O/P EST HI 40 MIN: CPT | Mod: PBBFAC,27

## 2024-07-17 PROCEDURE — 3079F DIAST BP 80-89 MM HG: CPT | Mod: CPTII,,,

## 2024-07-17 PROCEDURE — 90750 HZV VACC RECOMBINANT IM: CPT | Mod: PBBFAC

## 2024-07-17 PROCEDURE — 3074F SYST BP LT 130 MM HG: CPT | Mod: CPTII,,,

## 2024-07-17 PROCEDURE — 1159F MED LIST DOCD IN RCRD: CPT | Mod: CPTII,,,

## 2024-07-17 PROCEDURE — 3008F BODY MASS INDEX DOCD: CPT | Mod: CPTII,,,

## 2024-07-17 PROCEDURE — 3075F SYST BP GE 130 - 139MM HG: CPT | Mod: CPTII,,,

## 2024-07-17 PROCEDURE — 1160F RVW MEDS BY RX/DR IN RCRD: CPT | Mod: CPTII,,,

## 2024-07-17 PROCEDURE — 99215 OFFICE O/P EST HI 40 MIN: CPT | Mod: PBBFAC

## 2024-07-17 PROCEDURE — 99396 PREV VISIT EST AGE 40-64: CPT | Mod: S$PBB,,,

## 2024-07-17 RX ORDER — ATORVASTATIN CALCIUM 20 MG/1
20 TABLET, FILM COATED ORAL DAILY
Qty: 90 TABLET | Refills: 3 | Status: SHIPPED | OUTPATIENT
Start: 2024-07-17 | End: 2025-07-17

## 2024-07-17 RX ADMIN — ZOSTER VACCINE RECOMBINANT, ADJUVANTED 0.5 ML: KIT at 01:07

## 2024-07-17 NOTE — ASSESSMENT & PLAN NOTE
Shingles #1 vaccine was administered.  Ok to take acetaminophen for soreness of arm.    palpitations/chest pain

## 2024-07-17 NOTE — ASSESSMENT & PLAN NOTE
BP Readings from Last 3 Encounters:   07/17/24 126/83   06/12/24 135/76   04/14/24 123/75      At goal.  Follow a low sodium (less than 2 grams of sodium per day), DASH diet.   Continue atenolol as prescribed.  Monitor blood pressure and report any consistent values greater than 140/90 and keep a log.  Maintain healthy weight with a BMI goal of <30.   Aerobic exercise for 150 minutes per week (or 5 days a week for 30 minutes each day).

## 2024-07-17 NOTE — ASSESSMENT & PLAN NOTE
Wellness labs - 7/17/2024.   Cervical Cancer Screening - Hyst 2013. GYN appt: 7/17/2024.  Breast Cancer Screening - Last Mammogram on 6/26/2024. Birads-1. Follow up annually.  Colon Cancer Screening - No prior screening. Cologuard ordered.   Osteoporosis Screening - .Declines.  Eye Exam - Several years. List of local eye doctors provided to patient.  Dental Exam - Several years. List of local dentists provided to patient.  Vaccinations: Flu - Not currently being offered, recommended annually. / Shingles - 7/17/2024 / Tetanus -Declines

## 2024-07-17 NOTE — PROGRESS NOTES
[facility] -  Gynecology / Women's Health Clinic     Subjective:      Patient ID: Candace Dailey is a 54 y.o. female.    Chief Complaint:  Gynecologic Exam      History of Present Illness:   used throughout entire visit Olvin, #[id number]    The patient  here for annual exam. Daughter with patient throughout entire exam. Pt had Hysterectomy  d/t AUB-L. Denies history of abnormal paps. MG- 24 & BIRADS 1. Denies breast or urinary complaints. Denies pelvic pain, abnormal bleeding or discharge. Pt admits hot flashes but manageable. Pt admits to vaginal dryness, denies using lubrication. Pt reports no STIs in the past and no concerns. Pt admits sexually active. Denies tobacco use. Denies fly hx of breast, ovarian or colon cancer. Mother with uterine cancer. Cologuard ordered per PCP.     GYN & OB History:  No LMP recorded. Patient has had a hysterectomy.     OB History    Para Term  AB Living   3 3 3         SAB IAB Ectopic Multiple Live Births                  # Outcome Date GA Lbr Nacho/2nd Weight Sex Type Anes PTL Lv   3 Term      Vag-Spont      2 Term      Vag-Spont      1 Term      Vag-Spont          Past Medical History:   Diagnosis Date    Hypertension     Seizures         Past Surgical History:   Procedure Laterality Date    HYSTERECTOMY          Social History     Tobacco Use    Smoking status: Never    Smokeless tobacco: Never   Substance and Sexual Activity    Alcohol use: Never    Drug use: Never    Sexual activity: Not Currently        Current Outpatient Medications   Medication Instructions    atenoloL (TENORMIN) 50 mg, Oral, Daily    atorvastatin (LIPITOR) 20 mg, Oral, Daily    busPIRone (BUSPAR) 5 mg, Oral, 2 times daily    cyclobenzaprine (FLEXERIL) 10 mg, Oral, 3 times daily PRN    diclofenac (VOLTAREN) 50 mg, Oral, 2 times daily PRN    lamoTRIgine (LAMICTAL) 25 MG tablet Take 2 tablets (50 mg total) by mouth before breakfast AND 4 tablets (100  "mg total) every evening.    meclizine (ANTIVERT) 12.5 mg, Oral, 3 times daily PRN    sertraline (ZOLOFT) 25 mg, Oral, Nightly       Review of patient's allergies indicates:  No Known Allergies      Review of Systems:  Review of Systems  Negative except for pertinent findings for positives per HPI.     Objective:   Physical Exam   Visit Vitals  /76   Pulse 61   Temp 97.7 °F (36.5 °C) (Oral)   Resp 18   Ht 5' 3" (1.6 m)   Wt 102.3 kg (225 lb 9.6 oz)   SpO2 100%   BMI 39.96 kg/m²       GENERAL: Well-developed female in no acute distress.  SKIN: Normal to inspection,warm, dry and intact.  BREASTS: No rashes or erythema. No masses, lumps, discharge, tenderness.  VULVA: General appearance WNL; external genitalia with no lesions or erythema.  BIMANUAL EXAM: Vaginal mucosa/vault atrophic, Vaginal cuff intact. Uterus/Cervix surgically absent. Bilateral adnexa reveal no tenderness.  PSYCHIATRIC: Patient is oriented to person, place, and time. Mood and affect are normal.    Assessment:       ICD-10-CM ICD-9-CM   1. Well woman exam with routine gynecological exam  Z01.419 V72.31   2. Encounter for screening mammogram for breast cancer  Z12.31 V76.12   3. Atrophic vaginitis  N95.2 627.3       Plan:     1. Well woman exam with routine gynecological exam  -     Ambulatory referral/consult to Gynecology    2. Encounter for screening mammogram for breast cancer  -     Mammo Digital Screening Bilat w/ Shantanu; Future; Expected date: 06/30/2025    3. Atrophic vaginitis    Pelvic exam today, pap deferred d/t hysterectomy of benign causes  MG ordered    Replens vaginal moisturizer use twice weekly, use lubrication during sexual intercourse    Call with any GYN concerns  Follow up in about 1 year (around 7/17/2025) for Annual.    "

## 2024-07-17 NOTE — ASSESSMENT & PLAN NOTE
Lab Results   Component Value Date    .00 (H) 07/12/2024       Lab Results   Component Value Date    TRIG 168 (H) 07/12/2024       Lab Results   Component Value Date    HDL 28 (L) 07/12/2024        Lab Results   Component Value Date    CHOL 207 (H) 07/12/2024      Rx atorvastatin.  Follow a low cholesterol, low saturated fat diet with less than 200 mg of cholesterol a day.   Avoid fried foods and high saturated fats.  Add flax seed or fish oil supplements to diet.   Increase dietary fiber.   Regular exercise improves cholesterol levels.  Physical activity 5 times a week for 30 minutes per day (or 150 minutes per week).   Stressed importance of dietary modifications.

## 2024-07-17 NOTE — PATIENT INSTRUCTIONS
RECORDATORIO: Complete los laboratorios dentro de 1 semana de la eduardo.  Complete la encuesta de satisfacción cuando la reciba. Francisca.    Hi Candace,     If you are due for any health screening(s) below please notify me so we can arrange them to be ordered and scheduled. Most healthy patients at your age complete them, but you are free to accept or refuse.     If you can't do it, I'll definitely understand. If you can, I'd certainly appreciate it!    Tests to Keep You Healthy    Mammogram: Met on 6/26/2024  Colon Cancer Screening: ORDERED  Last Blood Pressure <= 139/89 (7/17/2024): Yes      Its time for your colon cancer screening     Colorectal cancer is one of the leading causes of cancer death for men and women but it doesnt have to be. Screenings can prevent colorectal cancer or find it early enough to treat and cure the disease.     Our records indicate that you may be overdue for colon cancer screening. A colonoscopy or stool screening test can help identify patients at risk for developing colon cancer. Cancer screenings save lives, so schedule yours today to stay healthy.     A colonoscopy is the preferred test for detecting colon cancer. It is needed only once every 10 years if results are negative. While you are sedated, a flexible, lighted tube with a tiny camera is inserted into the rectum and advanced through the colon to look for cancers.     An alternative screening test that is used at home and returned to the lab may also be used. It detects hidden blood in bowel movements which could indicate cancer in the colon. If results are positive, you will need a colonoscopy to determine if the blood is a sign of cancer. This type of follow up (diagnostic) colonoscopy usually requires additional copays as required by your insurance provider.     If you recently had your colon cancer screening performed outside of [facility], please let your Health care team know so that they can update your health  record. Please contact your PCP if you have any questions.

## 2024-07-17 NOTE — ASSESSMENT & PLAN NOTE
Lab Results   Component Value Date    HGBA1C 5.8 07/12/2024     Medication initiation if HgbA1C becomes >6.5.  Avoid soda, simple sweets, and limit rice/pasta/bread/starches and consume brown options when possible.    Maintain healthy weight with BMI goal <30.    Perform aerobic exercise for 150 minutes per week (or 5 days a week for 30 minutes each day).

## 2024-07-17 NOTE — PROGRESS NOTES
"    PATIENT NAME: Candace Dailey  : 1970  DATE: 24  MRN: [id number]          Reason for Visit/Chief Complaint   Annual Exam and Results       History of Present Illness (HPI)     Candace Dailey is a 54 y.o. [facility] female presenting in clinic today for an Annual Exam and Results. PMH: HTN, HLD, seizure d/o (last seizure 7 years ago), Hy2013. Lon -  ([id number]). Daughter present during visit.    She is scheduled to establish care with [facility] Gyn clinic on 2024, [facility] neurology clinic on 2024, and [facility] neurology clinic on 2025. She was scheduled for an EEG, but patient and daughter states the EEG was cancelled due to unable to pay for cost of test "over $1000" allegedly. Reports dizziness has improved since last office visit.     (2024) - PHQ9-17 and GAD7-11. She denies SI/HI. She also reports insomnia with "tossing and turning" all night.  No previous treatment for anxiety or depression.I have reviewed the positive depression score which warrants active treatment with psychotherapy and/or medications. She was prescribed sertraline and buspirone. Today, PHQ9-0 and GAD7-5.     Amendable to shingles vaccine. Denies smoking, drinking, or illicit drug use. Denies chest pain, shortness of breath, cough, headache, weakness, abdominal pain, nausea, vomiting, diarrhea, constipation, dysuria, SI, and HI.    Cervical Cancer Screening - 2013. GYN appt: 2024.  Breast Cancer Screening - Last Mammogram on 2024. Birads-1. Follow up annually.  Colon Cancer Screening - No prior screening. Cologuard ordered.   Osteoporosis Screening - .Declines.  Eye Exam - Several years. List of local eye doctors provided to patient.  Dental Exam - Several years. List of local dentists provided to patient.  Vaccinations: Flu - Not currently being offered, recommended annually. / Shingles - 2024 / Tetanus -Declines           Review of Systems     Review of Systems   Constitutional: " "Negative.    HENT: Negative.     Eyes: Negative.    Respiratory: Negative.     Cardiovascular: Negative.    Gastrointestinal: Negative.    Endocrine: Negative.    Genitourinary: Negative.    Musculoskeletal:  Positive for arthralgias. Negative for neck pain.   Skin: Negative.    Allergic/Immunologic: Negative.    Neurological:  Negative for dizziness and seizures.   Hematological: Negative.    Psychiatric/Behavioral:  Negative for dysphoric mood, self-injury, sleep disturbance and suicidal ideas. The patient is not nervous/anxious.    All other systems reviewed and are negative.      Medical / Social / Family History     Past Medical History:   Diagnosis Date    Hypertension     Seizures      Past Surgical History:   Procedure Laterality Date    HYSTERECTOMY       Social History  Candace Denise  reports that she has never smoked. She has never used smokeless tobacco. She reports that she does not drink alcohol and does not use drugs.    Family History  Cnadace Denise family history is not on file.    Medications and Allergies     Medications  Current Outpatient Medications   Medication Instructions    atenoloL (TENORMIN) 50 mg, Oral, Daily    atorvastatin (LIPITOR) 20 mg, Oral, Daily    busPIRone (BUSPAR) 5 mg, Oral, 2 times daily    cyclobenzaprine (FLEXERIL) 10 mg, Oral, 3 times daily PRN    diclofenac (VOLTAREN) 50 mg, Oral, 2 times daily PRN    lamoTRIgine (LAMICTAL) 25 MG tablet Take 2 tablets (50 mg total) by mouth before breakfast AND 4 tablets (100 mg total) every evening.    meclizine (ANTIVERT) 12.5 mg, Oral, 3 times daily PRN    sertraline (ZOLOFT) 25 mg, Oral, Nightly       Allergies  Review of patient's allergies indicates:  No Known Allergies    Physical Examination   Visit Vitals  /83 (BP Location: Right arm, Patient Position: Sitting, BP Method: Large (Automatic))   Pulse 72   Temp 98.3 °F (36.8 °C) (Oral)   Resp 20   Ht 5' 3" (1.6 m)   Wt 102.9 kg (226 lb 13.7 oz)   SpO2 (!) 94%   BMI 40.19 kg/m² "     Physical Exam  Vitals reviewed.   Constitutional:       Appearance: Normal appearance. She is normal weight.   HENT:      Head: Normocephalic and atraumatic.      Right Ear: External ear normal.      Left Ear: External ear normal.      Nose: Nose normal.      Mouth/Throat:      Mouth: Mucous membranes are moist.      Pharynx: Oropharynx is clear.   Eyes:      Extraocular Movements: Extraocular movements intact.      Conjunctiva/sclera: Conjunctivae normal.      Pupils: Pupils are equal, round, and reactive to light.   Cardiovascular:      Rate and Rhythm: Normal rate and regular rhythm.      Pulses: Normal pulses.      Heart sounds: Normal heart sounds.   Pulmonary:      Effort: Pulmonary effort is normal.      Breath sounds: Normal breath sounds.   Abdominal:      General: Bowel sounds are normal.      Palpations: Abdomen is soft.   Musculoskeletal:         General: Normal range of motion.      Cervical back: Normal range of motion and neck supple.   Skin:     General: Skin is warm and dry.      Capillary Refill: Capillary refill takes less than 2 seconds.   Neurological:      General: No focal deficit present.      Mental Status: She is alert and oriented to person, place, and time.   Psychiatric:         Mood and Affect: Mood normal.         Behavior: Behavior normal.         Thought Content: Thought content normal.         Judgment: Judgment normal.           Results     Lab Results   Component Value Date    WBC 8.78 07/12/2024    RBC 4.36 07/12/2024    HGB 13.5 07/12/2024    HCT 40.6 07/12/2024    MCV 93.1 07/12/2024    MCH 31.0 07/12/2024    MCHC 33.3 07/12/2024    RDW 12.3 07/12/2024     07/12/2024    MPV 9.7 07/12/2024      Lab Results   Component Value Date     07/12/2024    K 4.7 07/12/2024    CO2 26 07/12/2024    GLUCOSE 103 (H) 07/12/2024    BUN 11.7 07/12/2024    CREATININE 0.78 07/12/2024    LABPROT 8.2 07/12/2024    ALBUMIN 3.8 07/12/2024    BILITOT 0.4 07/12/2024    ALKPHOS 95  07/12/2024    AST 27 07/12/2024    ALT 37 07/12/2024    AGAP 8.0 07/12/2024    EGFRNORACEVR >60 07/12/2024     Lab Results   Component Value Date    TSH 1.688 07/12/2024     Lab Results   Component Value Date    CHOL 207 (H) 07/12/2024    HDL 28 (L) 07/12/2024    .00 (H) 07/12/2024    TRIG 168 (H) 07/12/2024     Lab Results   Component Value Date    SGUA 1.025 07/12/2024    PROTEINUA Negative 07/12/2024    BILIRUBINUA Negative 07/12/2024    WBCUA 0-5 07/12/2024    RBCUA 0-5 07/12/2024    BACTERIA Trace (A) 07/12/2024    LEUKOCYTESUR Negative 07/12/2024    UROBILINOGEN Normal 07/12/2024     Lab Results   Component Value Date    CREATRANDUR 176.7 (H) 07/12/2024    MICALBCREAT 5.1 07/12/2024     Lab Results   Component Value Date    LODNMRWC34YP 38 07/12/2024     Lab Results   Component Value Date    HIV Nonreactive 07/12/2024    HEPAIGM Nonreactive 07/12/2024    HEPBSAG Nonreactive 07/12/2024    HEPCAB Nonreactive 07/12/2024     Assessment and Plan (including Health Maintenance)     Problem List Items Addressed This Visit          Psychiatric    Anxiety and depression    Current Assessment & Plan         7/17/2024     1:14 PM 6/12/2024    11:00 AM 6/12/2024    10:39 AM   Depression Patient Health Questionnaire   Over the last two weeks how often have you been bothered by little interest or pleasure in doing things Not at all Nearly every day Not at all   Over the last two weeks how often have you been bothered by feeling down, depressed or hopeless Not at all Nearly every day Not at all   PHQ-2 Total Score 0 6 0   Over the last two weeks how often have you been bothered by trouble falling or staying asleep, or sleeping too much Not at all Nearly every day    Over the last two weeks how often have you been bothered by feeling tired or having little energy Not at all Nearly every day    Over the last two weeks how often have you been bothered by a poor appetite or overeating Not at all Nearly every day    Over  the last two weeks how often have you been bothered by feeling bad about yourself - or that you are a failure or have let yourself or your family down Not at all Several days    Over the last two weeks how often have you been bothered by trouble concentrating on things, such as reading the newspaper or watching television Not at all Several days    Over the last two weeks how often have you been bothered by moving or speaking so slowly that other people could have noticed. Or the opposite - being so fidgety or restless that you have been moving around a lot more than usual. Not at all Not at all    Over the last two weeks how often have you been bothered by thoughts that you would be better off dead, or of hurting yourself Not at all Not at all    If you checked off any problems, how difficult have these problems made it for you to do your work, take care of things at home or get along with other people? Not difficult at all Not difficult at all    PHQ-9 Score 0 17    PHQ-9 Interpretation Minimal or None Moderately Severe      BRYAN-7 Score: 5  Interpretation: Mild Anxiety   Continue buspirone and sertraline as prescribed.  Denies SI/HI  Read positive daily meditations, avoid negative media, set healthy boundaries.   Exercise daily, keep consistent sleep pattern, eat a healthy diet.   Establish good social support, make changes to reduce stress.   Do not drink alcohol or use illicit drugs.   Reports any symptoms of suicidal/homicidal ideations or self harm immediately, go to nearest emergency room.              ENT    Vertigo    Current Assessment & Plan     Improved.  Continue meclizine as prescribed.            Cardiac/Vascular    Primary hypertension    Current Assessment & Plan     BP Readings from Last 3 Encounters:   07/17/24 126/83   06/12/24 135/76   04/14/24 123/75      At goal.  Follow a low sodium (less than 2 grams of sodium per day), DASH diet.   Continue atenolol as prescribed.  Monitor blood pressure  and report any consistent values greater than 140/90 and keep a log.  Maintain healthy weight with a BMI goal of <30.   Aerobic exercise for 150 minutes per week (or 5 days a week for 30 minutes each day).          Relevant Orders    CBC Auto Differential    Comprehensive Metabolic Panel    Urinalysis    Mixed hyperlipidemia    Current Assessment & Plan     Lab Results   Component Value Date    .00 (H) 07/12/2024       Lab Results   Component Value Date    TRIG 168 (H) 07/12/2024       Lab Results   Component Value Date    HDL 28 (L) 07/12/2024        Lab Results   Component Value Date    CHOL 207 (H) 07/12/2024      Rx atorvastatin.  Follow a low cholesterol, low saturated fat diet with less than 200 mg of cholesterol a day.   Avoid fried foods and high saturated fats.  Add flax seed or fish oil supplements to diet.   Increase dietary fiber.   Regular exercise improves cholesterol levels.  Physical activity 5 times a week for 30 minutes per day (or 150 minutes per week).   Stressed importance of dietary modifications.          Relevant Medications    atorvastatin (LIPITOR) 20 MG tablet    Other Relevant Orders    Lipid Panel       ID    Immunization due    Current Assessment & Plan     Shingles #1 vaccine was administered.  Ok to take acetaminophen for soreness of arm.          Relevant Medications    varicella-zoster GE-AS01B (PF)(SHINGRIX) 50 mcg/0.5 mL KIT (Completed)       Endocrine    Class 3 severe obesity due to excess calories with serious comorbidity and body mass index (BMI) of 40.0 to 44.9 in adult    Current Assessment & Plan     Body mass index is 40.19 kg/m².  Goal BMI <30.  Aerobic exercise 150 minutes per week.  Avoid soda, simple sugars, sweets, excessive rice, pasta, potatoes or bread.   Choose brown options when available and portion control.  Limit fast foods and fried foods.   Choose complex carbs in moderation (ex: green, leafy vegetables, beans, oatmeal).  Eat plenty of fresh fruits  and vegetables with lean meats daily.   Consider permanent healthy lifestyle changes.          Prediabetes    Current Assessment & Plan     Lab Results   Component Value Date    HGBA1C 5.8 07/12/2024     Medication initiation if HgbA1C becomes >6.5.  Avoid soda, simple sweets, and limit rice/pasta/bread/starches and consume brown options when possible.    Maintain healthy weight with BMI goal <30.    Perform aerobic exercise for 150 minutes per week (or 5 days a week for 30 minutes each day).           Relevant Orders    Hemoglobin A1C       GI    Screening for malignant neoplasm of colon    Current Assessment & Plan     Cologuard ordered, will refer to GI if indicated.          Relevant Orders    Cologuard Screening (Multitarget Stool DNA)       Other    Well adult exam - Primary    Current Assessment & Plan     Wellness labs - 7/17/2024.   Cervical Cancer Screening - Hyst 2013. GYN appt: 7/17/2024.  Breast Cancer Screening - Last Mammogram on 6/26/2024. Birads-1. Follow up annually.  Colon Cancer Screening - No prior screening. Cologuard ordered.   Osteoporosis Screening - .Declines.  Eye Exam - Several years. List of local eye doctors provided to patient.  Dental Exam - Several years. List of local dentists provided to patient.  Vaccinations: Flu - Not currently being offered, recommended annually. / Shingles - 7/17/2024 / Tetanus -Declines                   Health Maintenance Due   Topic Date Due    TETANUS VACCINE  Never done    Colorectal Cancer Screening  Never done    COVID-19 Vaccine (1 - 2023-24 season) Never done     Tests to Keep You Healthy    Mammogram: Met on 6/26/2024  Colon Cancer Screening: ORDERED  Last Blood Pressure <= 139/89 (7/17/2024): Yes      Health Maintenance Topics with due status: Not Due       Topic Last Completion Date    Mammogram 06/26/2024    Lipid Panel 07/12/2024    Shingles Vaccine 07/17/2024    Influenza Vaccine Not Due       Future Appointments   Date Time Provider Department  [place]   8/13/2024  8:30 AM AUDIOLOGIST 2, [facility] AUDIOLOGY [facility] AUDIO [place]   1/21/2025 12:40 PM Becky Anderson FNP [facility] INTMED [place]    1/22/2025 11:00 AM Yumiko Ceballos MD [facility] NEURO [facility]        Follow up in about 6 months (around 1/17/2025) for F2F, Follow up, Med check, Lab review.          Signature:        SHARON Aburto  [facility]  [address] [id number]    Date of encounter: 7/17/24

## 2024-07-17 NOTE — ASSESSMENT & PLAN NOTE
7/17/2024     1:14 PM 6/12/2024    11:00 AM 6/12/2024    10:39 AM   Depression Patient Health Questionnaire   Over the last two weeks how often have you been bothered by little interest or pleasure in doing things Not at all Nearly every day Not at all   Over the last two weeks how often have you been bothered by feeling down, depressed or hopeless Not at all Nearly every day Not at all   PHQ-2 Total Score 0 6 0   Over the last two weeks how often have you been bothered by trouble falling or staying asleep, or sleeping too much Not at all Nearly every day    Over the last two weeks how often have you been bothered by feeling tired or having little energy Not at all Nearly every day    Over the last two weeks how often have you been bothered by a poor appetite or overeating Not at all Nearly every day    Over the last two weeks how often have you been bothered by feeling bad about yourself - or that you are a failure or have let yourself or your family down Not at all Several days    Over the last two weeks how often have you been bothered by trouble concentrating on things, such as reading the newspaper or watching television Not at all Several days    Over the last two weeks how often have you been bothered by moving or speaking so slowly that other people could have noticed. Or the opposite - being so fidgety or restless that you have been moving around a lot more than usual. Not at all Not at all    Over the last two weeks how often have you been bothered by thoughts that you would be better off dead, or of hurting yourself Not at all Not at all    If you checked off any problems, how difficult have these problems made it for you to do your work, take care of things at home or get along with other people? Not difficult at all Not difficult at all    PHQ-9 Score 0 17    PHQ-9 Interpretation Minimal or None Moderately Severe      BRYAN-7 Score: 5  Interpretation: Mild Anxiety   Continue buspirone and sertraline  as prescribed.  Denies SI/HI  Read positive daily meditations, avoid negative media, set healthy boundaries.   Exercise daily, keep consistent sleep pattern, eat a healthy diet.   Establish good social support, make changes to reduce stress.   Do not drink alcohol or use illicit drugs.   Reports any symptoms of suicidal/homicidal ideations or self harm immediately, go to nearest emergency room.

## 2024-08-06 ENCOUNTER — TELEPHONE (OUTPATIENT)
Dept: INTERNAL MEDICINE | Facility: CLINIC | Age: 54
End: 2024-08-06
Payer: COMMERCIAL

## 2024-08-12 DIAGNOSIS — E78.2 MIXED HYPERLIPIDEMIA: ICD-10-CM

## 2024-08-12 DIAGNOSIS — R42 VERTIGO: ICD-10-CM

## 2024-08-12 DIAGNOSIS — G40.909 SEIZURE DISORDER: ICD-10-CM

## 2024-08-12 DIAGNOSIS — F32.A ANXIETY AND DEPRESSION: ICD-10-CM

## 2024-08-12 DIAGNOSIS — M54.2 CERVICALGIA: ICD-10-CM

## 2024-08-12 DIAGNOSIS — I10 PRIMARY HYPERTENSION: ICD-10-CM

## 2024-08-12 DIAGNOSIS — F41.9 ANXIETY AND DEPRESSION: ICD-10-CM

## 2024-08-12 RX ORDER — LAMOTRIGINE 25 MG/1
TABLET ORAL
Qty: 540 TABLET | Refills: 2 | Status: SHIPPED | OUTPATIENT
Start: 2024-08-12 | End: 2025-08-12

## 2024-08-12 RX ORDER — CYCLOBENZAPRINE HCL 10 MG
10 TABLET ORAL 3 TIMES DAILY PRN
Qty: 60 TABLET | Refills: 2 | Status: SHIPPED | OUTPATIENT
Start: 2024-08-12 | End: 2025-08-12

## 2024-08-12 RX ORDER — BUSPIRONE HYDROCHLORIDE 5 MG/1
5 TABLET ORAL 2 TIMES DAILY
Qty: 60 TABLET | Refills: 1 | Status: SHIPPED | OUTPATIENT
Start: 2024-08-12 | End: 2025-08-12

## 2024-08-12 RX ORDER — DICLOFENAC SODIUM 50 MG/1
50 TABLET, DELAYED RELEASE ORAL 2 TIMES DAILY PRN
Qty: 60 TABLET | Refills: 2 | Status: SHIPPED | OUTPATIENT
Start: 2024-08-12 | End: 2025-08-12

## 2024-08-12 RX ORDER — MECLIZINE HCL 12.5 MG 12.5 MG/1
12.5 TABLET ORAL 3 TIMES DAILY PRN
Qty: 60 TABLET | Refills: 1 | Status: SHIPPED | OUTPATIENT
Start: 2024-08-12 | End: 2025-08-12

## 2024-08-12 RX ORDER — SERTRALINE HYDROCHLORIDE 25 MG/1
25 TABLET, FILM COATED ORAL NIGHTLY
Qty: 40 TABLET | Refills: 1 | Status: SHIPPED | OUTPATIENT
Start: 2024-08-12 | End: 2025-08-12

## 2024-08-12 RX ORDER — ATENOLOL 50 MG/1
50 TABLET ORAL DAILY
Qty: 90 TABLET | Refills: 2 | Status: SHIPPED | OUTPATIENT
Start: 2024-08-12 | End: 2025-08-12

## 2024-08-12 RX ORDER — ATORVASTATIN CALCIUM 20 MG/1
20 TABLET, FILM COATED ORAL DAILY
Qty: 90 TABLET | Refills: 3 | Status: SHIPPED | OUTPATIENT
Start: 2024-08-12 | End: 2025-08-12

## 2024-10-21 PROBLEM — Z00.00 WELL ADULT EXAM: Status: RESOLVED | Noted: 2024-06-12 | Resolved: 2024-10-21

## 2025-06-23 DIAGNOSIS — G40.909 SEIZURE DISORDER: ICD-10-CM

## 2025-06-23 RX ORDER — LAMOTRIGINE 25 MG/1
TABLET ORAL
Qty: 180 TABLET | Refills: 2 | Status: SHIPPED | OUTPATIENT
Start: 2025-06-23 | End: 2025-09-21

## 2025-08-13 DIAGNOSIS — R42 VERTIGO: ICD-10-CM

## 2025-08-13 RX ORDER — MECLIZINE HCL 12.5 MG 12.5 MG/1
12.5 TABLET ORAL 3 TIMES DAILY PRN
Qty: 60 TABLET | Refills: 1 | Status: SHIPPED | OUTPATIENT
Start: 2025-08-13 | End: 2026-08-13

## 2025-08-27 DIAGNOSIS — F32.A ANXIETY AND DEPRESSION: ICD-10-CM

## 2025-08-27 DIAGNOSIS — I10 PRIMARY HYPERTENSION: ICD-10-CM

## 2025-08-27 DIAGNOSIS — F41.9 ANXIETY AND DEPRESSION: ICD-10-CM

## 2025-08-27 DIAGNOSIS — E78.2 MIXED HYPERLIPIDEMIA: ICD-10-CM

## 2025-08-27 RX ORDER — ATENOLOL 50 MG/1
50 TABLET ORAL DAILY
Qty: 90 TABLET | Refills: 0 | Status: SHIPPED | OUTPATIENT
Start: 2025-08-27 | End: 2025-11-25